# Patient Record
Sex: FEMALE | Race: ASIAN | NOT HISPANIC OR LATINO | URBAN - METROPOLITAN AREA
[De-identification: names, ages, dates, MRNs, and addresses within clinical notes are randomized per-mention and may not be internally consistent; named-entity substitution may affect disease eponyms.]

---

## 2021-02-12 ENCOUNTER — TELEPHONE (OUTPATIENT)
Dept: SCHEDULING | Facility: IMAGING CENTER | Age: 41
End: 2021-02-12

## 2021-02-25 ASSESSMENT — ENCOUNTER SYMPTOMS
CHILLS: 0
DEPRESSION: 0
VOMITING: 0
DIARRHEA: 0
BLURRED VISION: 0
PALPITATIONS: 0
WEAKNESS: 0
SHORTNESS OF BREATH: 0
CONSTIPATION: 0
NAUSEA: 0
FEVER: 0

## 2021-02-26 NOTE — PROGRESS NOTES
This evaluation was conducted via Zoom using secure and encrypted videoconferencing technology. The patient was in a private location in the [facility].    The patient's identity was confirmed and verbal consent was obtained for this virtual visit.      History of Present Illness  40 year old female presents via video visit to establish care.  She does not take any prescription medication, and is feeling generally well.    She does have a mole on the right side of her nose which is being followed by dermatology in [place], she was told to keep an eye on it for any changes.  She states it has been growing slowly over the last year.  She is wondering if she can have a referral to dermatology here in [place].    She denies any other questions or concerns at this time.    ROS  Review of Systems   Constitutional: Negative for chills and fever.   HENT: Negative for hearing loss.    Eyes: Negative for blurred vision.   Respiratory: Negative for shortness of breath.    Cardiovascular: Negative for chest pain and palpitations.   Gastrointestinal: Negative for constipation, diarrhea, nausea and vomiting.   Genitourinary: Negative for dysuria and hematuria.   Skin: Negative for rash.   Neurological: Negative for weakness.   Psychiatric/Behavioral: Negative for depression.     Medications  No current outpatient medications on file.     No current facility-administered medications for this visit.     Allergies  No Known Allergies    Problem List  There is no problem list on file for this patient.    Past Medical History  History reviewed. No pertinent past medical history.     Past Surgical History  Past Surgical History:   Procedure Laterality Date   • APPENDECTOMY  2006     Past Family History  Family History   Problem Relation Age of Onset   • No Known Problems Mother    • Heart Disease Father    • No Known Problems Sister    • Asthma Brother    • Cancer Maternal Grandfather         Stomach   • No Known Problems  "Son    • No Known Problems Daughter      Social History  She reports eating a healthy and balanced diet, but does not get regular exercise due to gym closure from COVID. She does not work outside of the home.  She drinks an average of 1-2 alcoholic beverages per month. She denies any tobacco product or illicit drug use. She is sexually active with one, male partner and uses condoms as contraception.     Physical Exam  Ht 1.549 m (5' 1\")   Wt 56.2 kg (124 lb)   BMI 23.43 kg/m²   Physical Exam   Constitutional: She is oriented to person, place, and time and well-developed, well-nourished, and in no distress. No distress.   Eyes: Right eye exhibits no discharge. Left eye exhibits no discharge. No scleral icterus.   Pulmonary/Chest: Effort normal. No respiratory distress.   Neurological: She is alert and oriented to person, place, and time.   Skin: She is not diaphoretic.   Psychiatric: Affect and judgment normal.     Assessment & Plan  1. Visit for preventive health examination  2. Encounter for screening mammogram for breast cancer  Health maintenance status reviewed and updated. We discussed diet, exercise, vaccinations, skin cancer prevention and detection, seat belt use, and regular eye and dental exams.  - Comp Metabolic Panel; Future  - Lipid Profile; Future  - HEMOGLOBIN A1C; Future  - MA-SCREENING MAMMO BILAT W/CAD; Future    3. Change in skin mole  Hard to assess via video visit, but she does report her mole has been changing.  I have placed a referral to dermatology.  - REFERRAL TO DERMATOLOGY    Return in about 1 year (around 3/3/2022) for Annual physical.    Cecilia Mckeon M.D.   "

## 2021-03-03 ENCOUNTER — TELEMEDICINE (OUTPATIENT)
Dept: MEDICAL GROUP | Facility: MEDICAL CENTER | Age: 41
End: 2021-03-03
Payer: COMMERCIAL

## 2021-03-03 VITALS — HEIGHT: 61 IN | BODY MASS INDEX: 23.41 KG/M2 | WEIGHT: 124 LBS

## 2021-03-03 DIAGNOSIS — Z00.00 VISIT FOR PREVENTIVE HEALTH EXAMINATION: ICD-10-CM

## 2021-03-03 DIAGNOSIS — D22.9 CHANGE IN SKIN MOLE: ICD-10-CM

## 2021-03-03 DIAGNOSIS — Z12.31 ENCOUNTER FOR SCREENING MAMMOGRAM FOR BREAST CANCER: ICD-10-CM

## 2021-03-03 PROCEDURE — 99386 PREV VISIT NEW AGE 40-64: CPT | Performed by: FAMILY MEDICINE

## 2021-03-03 ASSESSMENT — PATIENT HEALTH QUESTIONNAIRE - PHQ9: CLINICAL INTERPRETATION OF PHQ2 SCORE: 0

## 2021-07-29 ENCOUNTER — HOSPITAL ENCOUNTER (OUTPATIENT)
Dept: RADIOLOGY | Facility: MEDICAL CENTER | Age: 41
End: 2021-07-29
Payer: COMMERCIAL

## 2021-08-16 ENCOUNTER — HOSPITAL ENCOUNTER (OUTPATIENT)
Dept: RADIOLOGY | Facility: MEDICAL CENTER | Age: 41
End: 2021-08-16
Attending: FAMILY MEDICINE
Payer: COMMERCIAL

## 2021-08-16 DIAGNOSIS — Z12.31 ENCOUNTER FOR SCREENING MAMMOGRAM FOR BREAST CANCER: ICD-10-CM

## 2021-08-16 PROCEDURE — 77063 BREAST TOMOSYNTHESIS BI: CPT

## 2021-08-16 SDOH — ECONOMIC STABILITY: HOUSING INSECURITY: IN THE LAST 12 MONTHS, HOW MANY PLACES HAVE YOU LIVED?: 2

## 2021-08-16 SDOH — ECONOMIC STABILITY: FOOD INSECURITY: WITHIN THE PAST 12 MONTHS, YOU WORRIED THAT YOUR FOOD WOULD RUN OUT BEFORE YOU GOT MONEY TO BUY MORE.: NEVER TRUE

## 2021-08-16 SDOH — HEALTH STABILITY: PHYSICAL HEALTH: ON AVERAGE, HOW MANY MINUTES DO YOU ENGAGE IN EXERCISE AT THIS LEVEL?: 20 MIN

## 2021-08-16 SDOH — ECONOMIC STABILITY: FOOD INSECURITY: WITHIN THE PAST 12 MONTHS, THE FOOD YOU BOUGHT JUST DIDN'T LAST AND YOU DIDN'T HAVE MONEY TO GET MORE.: NEVER TRUE

## 2021-08-16 SDOH — ECONOMIC STABILITY: TRANSPORTATION INSECURITY
IN THE PAST 12 MONTHS, HAS LACK OF RELIABLE TRANSPORTATION KEPT YOU FROM MEDICAL APPOINTMENTS, MEETINGS, WORK OR FROM GETTING THINGS NEEDED FOR DAILY LIVING?: NO

## 2021-08-16 SDOH — ECONOMIC STABILITY: TRANSPORTATION INSECURITY
IN THE PAST 12 MONTHS, HAS THE LACK OF TRANSPORTATION KEPT YOU FROM MEDICAL APPOINTMENTS OR FROM GETTING MEDICATIONS?: NO

## 2021-08-16 SDOH — ECONOMIC STABILITY: HOUSING INSECURITY
IN THE LAST 12 MONTHS, WAS THERE A TIME WHEN YOU DID NOT HAVE A STEADY PLACE TO SLEEP OR SLEPT IN A SHELTER (INCLUDING NOW)?: NO

## 2021-08-16 SDOH — HEALTH STABILITY: PHYSICAL HEALTH: ON AVERAGE, HOW MANY DAYS PER WEEK DO YOU ENGAGE IN MODERATE TO STRENUOUS EXERCISE (LIKE A BRISK WALK)?: 4 DAYS

## 2021-08-16 SDOH — ECONOMIC STABILITY: INCOME INSECURITY: IN THE LAST 12 MONTHS, WAS THERE A TIME WHEN YOU WERE NOT ABLE TO PAY THE MORTGAGE OR RENT ON TIME?: NO

## 2021-08-16 SDOH — HEALTH STABILITY: MENTAL HEALTH
STRESS IS WHEN SOMEONE FEELS TENSE, NERVOUS, ANXIOUS, OR CAN'T SLEEP AT NIGHT BECAUSE THEIR MIND IS TROUBLED. HOW STRESSED ARE YOU?: ONLY A LITTLE

## 2021-08-16 SDOH — ECONOMIC STABILITY: INCOME INSECURITY: HOW HARD IS IT FOR YOU TO PAY FOR THE VERY BASICS LIKE FOOD, HOUSING, MEDICAL CARE, AND HEATING?: NOT HARD AT ALL

## 2021-08-16 SDOH — ECONOMIC STABILITY: TRANSPORTATION INSECURITY
IN THE PAST 12 MONTHS, HAS LACK OF TRANSPORTATION KEPT YOU FROM MEETINGS, WORK, OR FROM GETTING THINGS NEEDED FOR DAILY LIVING?: NO

## 2021-08-16 ASSESSMENT — SOCIAL DETERMINANTS OF HEALTH (SDOH)
HOW HARD IS IT FOR YOU TO PAY FOR THE VERY BASICS LIKE FOOD, HOUSING, MEDICAL CARE, AND HEATING?: NOT HARD AT ALL
HOW OFTEN DO YOU ATTENT MEETINGS OF THE CLUB OR ORGANIZATION YOU BELONG TO?: NEVER
IN A TYPICAL WEEK, HOW MANY TIMES DO YOU TALK ON THE PHONE WITH FAMILY, FRIENDS, OR NEIGHBORS?: MORE THAN THREE TIMES A WEEK
HOW OFTEN DO YOU HAVE A DRINK CONTAINING ALCOHOL: 2-4 TIMES A MONTH
DO YOU BELONG TO ANY CLUBS OR ORGANIZATIONS SUCH AS CHURCH GROUPS UNIONS, FRATERNAL OR ATHLETIC GROUPS, OR SCHOOL GROUPS?: NO
HOW MANY DRINKS CONTAINING ALCOHOL DO YOU HAVE ON A TYPICAL DAY WHEN YOU ARE DRINKING: 3 OR 4
DO YOU BELONG TO ANY CLUBS OR ORGANIZATIONS SUCH AS CHURCH GROUPS UNIONS, FRATERNAL OR ATHLETIC GROUPS, OR SCHOOL GROUPS?: NO
IN A TYPICAL WEEK, HOW MANY TIMES DO YOU TALK ON THE PHONE WITH FAMILY, FRIENDS, OR NEIGHBORS?: MORE THAN THREE TIMES A WEEK
HOW OFTEN DO YOU GET TOGETHER WITH FRIENDS OR RELATIVES?: ONCE A WEEK
WITHIN THE PAST 12 MONTHS, YOU WORRIED THAT YOUR FOOD WOULD RUN OUT BEFORE YOU GOT THE MONEY TO BUY MORE: NEVER TRUE
HOW OFTEN DO YOU HAVE SIX OR MORE DRINKS ON ONE OCCASION: LESS THAN MONTHLY
HOW OFTEN DO YOU ATTENT MEETINGS OF THE CLUB OR ORGANIZATION YOU BELONG TO?: NEVER
HOW OFTEN DO YOU ATTEND CHURCH OR RELIGIOUS SERVICES?: PATIENT DECLINED
HOW OFTEN DO YOU GET TOGETHER WITH FRIENDS OR RELATIVES?: ONCE A WEEK
HOW OFTEN DO YOU ATTEND CHURCH OR RELIGIOUS SERVICES?: PATIENT DECLINED

## 2021-08-16 ASSESSMENT — LIFESTYLE VARIABLES
HOW MANY STANDARD DRINKS CONTAINING ALCOHOL DO YOU HAVE ON A TYPICAL DAY: 3 OR 4
HOW OFTEN DO YOU HAVE A DRINK CONTAINING ALCOHOL: 2-4 TIMES A MONTH
HOW OFTEN DO YOU HAVE SIX OR MORE DRINKS ON ONE OCCASION: LESS THAN MONTHLY

## 2021-08-19 ENCOUNTER — OFFICE VISIT (OUTPATIENT)
Dept: MEDICAL GROUP | Facility: LAB | Age: 41
End: 2021-08-19
Payer: COMMERCIAL

## 2021-08-19 VITALS
TEMPERATURE: 98.4 F | SYSTOLIC BLOOD PRESSURE: 102 MMHG | RESPIRATION RATE: 14 BRPM | WEIGHT: 118.8 LBS | HEIGHT: 63 IN | HEART RATE: 62 BPM | BODY MASS INDEX: 21.05 KG/M2 | OXYGEN SATURATION: 99 % | DIASTOLIC BLOOD PRESSURE: 74 MMHG

## 2021-08-19 DIAGNOSIS — Z13.6 ENCOUNTER FOR SCREENING FOR CARDIOVASCULAR DISORDERS: ICD-10-CM

## 2021-08-19 PROCEDURE — 99999 PR NO CHARGE: CPT | Performed by: FAMILY MEDICINE

## 2021-08-19 NOTE — PROGRESS NOTES
"Subjective:     Chief Complaint   Patient presents with   • Establish Care         HPI:   Tanvi presents today to establish care. Last visit was a telemedicine physical on March 2021. She does have some lab work to do still from that visit.   Last pap: Jan 2020, negative cotesting.   Mammo: Normal August 2021.   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     Diet: Lots of veggies good protein, tofu, soy based, rice. Some dairy, yogurt, milk, cheese. No OTC medications or vitamins.   Exercise: some stretching and such, inside because of smoke of late. Some walking  Sleep: Good.   Sometimes some seasonal allergies in the spring, but nothing much.     No current [facility]-ordered outpatient medications on file.     No current [facility]-ordered facility-administered medications on file.         ROS:  Gen: no fevers/chills, no changes in weight  Eyes: no changes in vision  ENT: no sore throat, no hearing loss, no bloody nose  Pulm: no sob, no cough  CV: no chest pain, no palpitations  GI: no nausea/vomiting, no diarrhea  : no dysuria  MSk: no myalgias  Skin: no rash      Objective:     Exam:  /74 (BP Location: Right arm, Patient Position: Sitting, BP Cuff Size: Adult)   Pulse 62   Temp 36.9 °C (98.4 °F)   Resp 14   Ht 1.6 m (5' 3\")   Wt 53.9 kg (118 lb 12.8 oz)   SpO2 99%   BMI 21.04 kg/m²  Body mass index is 21.04 kg/m².    Gen: Alert and oriented, No apparent distress.  Neck: Neck is supple without lymphadenopathy.  Lungs: Normal effort, CTA bilaterally, no wheezes, rhonchi, or rales  CV: Regular rate and rhythm. No murmurs, rubs, or gallops.  Ext: No clubbing, cyanosis, edema.        Labs:due    Assessment & Plan:     41 y.o. female with the following -     1. Encounter for screening for cardiovascular disorders  Reviewed past history, and family history.  She is doing quite well and is very healthy in general.  Not taking any over-the-counter or prescribed medications.  Laboratories were put in today for her to do fasting at " her convenience.  Other health maintenance things are caught up today.  - Comp Metabolic Panel; Future  - Lipid Profile; Future        No follow-ups on file.    Please note that this dictation was created using voice recognition software. I have made every reasonable attempt to correct obvious errors, but I expect that there are errors of grammar and possibly content that I did not discover before finalizing the note.

## 2021-09-15 ENCOUNTER — OFFICE VISIT (OUTPATIENT)
Dept: DERMATOLOGY | Facility: IMAGING CENTER | Age: 41
End: 2021-09-15
Payer: COMMERCIAL

## 2021-09-15 DIAGNOSIS — D48.5 NEOPLASM OF UNCERTAIN BEHAVIOR OF SKIN: ICD-10-CM

## 2021-09-15 PROCEDURE — 99203 OFFICE O/P NEW LOW 30 MIN: CPT | Performed by: NURSE PRACTITIONER

## 2021-09-15 NOTE — PROGRESS NOTES
DERMATOLOGY NOTE  NEW VISIT       Chief complaint: Establish Care and Skin Lesion         HPI/location: nose mole-lower nose on right    Time present: since 16 years old last   Painful lesion: No  Itching lesion: No   Enlarging lesion: Yes 3-4 years changing  Anything make it better or worse?    History of skin cancer: No  History of precancers/actinic keratoses: No  History of biopsies:No  History of blistering/severe sunburns:No  Family history of skin cancer:No  Family history of atypical moles:No      No Known Allergies     MEDICATIONS:  Medications relevant to specialty reviewed.     REVIEW OF SYSTEMS:   Positive for skin (see HPI)  Negative for fevers and chills       EXAM:  There were no vitals taken for this visit.  Constitutional: Well-developed, well-nourished, and in no distress.     A focused skin exam was performed including the affected areas of the head (including face). Notable findings on exam today listed below and/or in assessment/plan.     3mm dark brown papule to right ala-largely symmetrical     IMPRESSION / PLAN:    1. Neoplasm of uncertain behavior of skin  Given growth and dark color, biopsy is warranted  Given cosmetically sensitive area, referral to plastics done.     - REFERRAL TO PLASTIC SURGERY       Please note that this dictation was created using voice recognition software. I have made every reasonable attempt to correct obvious errors, but I expect that there are errors of grammar and possibly content that I did not discover before finalizing the note.      Return to clinic in: No follow-ups on file. and as needed for any new or changing skin lesions.

## 2021-10-07 ENCOUNTER — NON-PROVIDER VISIT (OUTPATIENT)
Dept: MEDICAL GROUP | Facility: LAB | Age: 41
End: 2021-10-07
Payer: COMMERCIAL

## 2021-10-07 DIAGNOSIS — Z23 NEED FOR VACCINATION: ICD-10-CM

## 2021-10-07 PROCEDURE — 90686 IIV4 VACC NO PRSV 0.5 ML IM: CPT | Performed by: FAMILY MEDICINE

## 2021-10-07 PROCEDURE — 90471 IMMUNIZATION ADMIN: CPT | Performed by: FAMILY MEDICINE

## 2021-10-07 NOTE — PROGRESS NOTES
"Tanvi Fontanez is a 41 y.o. female here for a non-provider visit for:   FLU    Reason for immunization: Annual Flu Vaccine  Immunization records indicate need for vaccine: Yes, confirmed with Epic  Minimum interval has been met for this vaccine: Yes  ABN completed: Not Indicated    VIS Dated  08/06/2021 was given to patient: Yes  All IAC Questionnaire questions were answered \"No.\"    Patient tolerated injection and no adverse effects were observed or reported: Yes    Pt scheduled for next dose in series: Not Indicated  "

## 2023-09-07 ENCOUNTER — APPOINTMENT (OUTPATIENT)
Dept: RADIOLOGY | Facility: MEDICAL CENTER | Age: 43
End: 2023-09-07
Attending: FAMILY MEDICINE
Payer: COMMERCIAL

## 2023-09-07 DIAGNOSIS — Z12.31 VISIT FOR SCREENING MAMMOGRAM: ICD-10-CM

## 2023-09-07 PROCEDURE — 77063 BREAST TOMOSYNTHESIS BI: CPT

## 2023-09-18 SDOH — HEALTH STABILITY: PHYSICAL HEALTH: ON AVERAGE, HOW MANY MINUTES DO YOU ENGAGE IN EXERCISE AT THIS LEVEL?: 90 MIN

## 2023-09-18 SDOH — HEALTH STABILITY: MENTAL HEALTH
STRESS IS WHEN SOMEONE FEELS TENSE, NERVOUS, ANXIOUS, OR CAN'T SLEEP AT NIGHT BECAUSE THEIR MIND IS TROUBLED. HOW STRESSED ARE YOU?: NOT AT ALL

## 2023-09-18 SDOH — HEALTH STABILITY: PHYSICAL HEALTH: ON AVERAGE, HOW MANY DAYS PER WEEK DO YOU ENGAGE IN MODERATE TO STRENUOUS EXERCISE (LIKE A BRISK WALK)?: 1 DAY

## 2023-09-18 SDOH — ECONOMIC STABILITY: INCOME INSECURITY: IN THE LAST 12 MONTHS, WAS THERE A TIME WHEN YOU WERE NOT ABLE TO PAY THE MORTGAGE OR RENT ON TIME?: NO

## 2023-09-18 SDOH — ECONOMIC STABILITY: FOOD INSECURITY: WITHIN THE PAST 12 MONTHS, YOU WORRIED THAT YOUR FOOD WOULD RUN OUT BEFORE YOU GOT MONEY TO BUY MORE.: NEVER TRUE

## 2023-09-18 SDOH — ECONOMIC STABILITY: HOUSING INSECURITY: IN THE LAST 12 MONTHS, HOW MANY PLACES HAVE YOU LIVED?: 1

## 2023-09-18 SDOH — ECONOMIC STABILITY: FOOD INSECURITY: WITHIN THE PAST 12 MONTHS, THE FOOD YOU BOUGHT JUST DIDN'T LAST AND YOU DIDN'T HAVE MONEY TO GET MORE.: NEVER TRUE

## 2023-09-18 SDOH — ECONOMIC STABILITY: INCOME INSECURITY: HOW HARD IS IT FOR YOU TO PAY FOR THE VERY BASICS LIKE FOOD, HOUSING, MEDICAL CARE, AND HEATING?: NOT HARD AT ALL

## 2023-09-18 ASSESSMENT — LIFESTYLE VARIABLES
HOW OFTEN DO YOU HAVE A DRINK CONTAINING ALCOHOL: 2-4 TIMES A MONTH
SKIP TO QUESTIONS 9-10: 0
AUDIT-C TOTAL SCORE: -1
HOW MANY STANDARD DRINKS CONTAINING ALCOHOL DO YOU HAVE ON A TYPICAL DAY: 1 OR 2
HOW OFTEN DO YOU HAVE SIX OR MORE DRINKS ON ONE OCCASION: PATIENT DECLINED

## 2023-09-18 ASSESSMENT — SOCIAL DETERMINANTS OF HEALTH (SDOH)
HOW MANY DRINKS CONTAINING ALCOHOL DO YOU HAVE ON A TYPICAL DAY WHEN YOU ARE DRINKING: 1 OR 2
HOW HARD IS IT FOR YOU TO PAY FOR THE VERY BASICS LIKE FOOD, HOUSING, MEDICAL CARE, AND HEATING?: NOT HARD AT ALL
HOW OFTEN DO YOU ATTENT MEETINGS OF THE CLUB OR ORGANIZATION YOU BELONG TO?: PATIENT DECLINED
HOW OFTEN DO YOU GET TOGETHER WITH FRIENDS OR RELATIVES?: ONCE A WEEK
HOW OFTEN DO YOU ATTENT MEETINGS OF THE CLUB OR ORGANIZATION YOU BELONG TO?: PATIENT DECLINED
WITHIN THE PAST 12 MONTHS, YOU WORRIED THAT YOUR FOOD WOULD RUN OUT BEFORE YOU GOT THE MONEY TO BUY MORE: NEVER TRUE
HOW OFTEN DO YOU HAVE A DRINK CONTAINING ALCOHOL: 2-4 TIMES A MONTH
IN A TYPICAL WEEK, HOW MANY TIMES DO YOU TALK ON THE PHONE WITH FAMILY, FRIENDS, OR NEIGHBORS?: MORE THAN THREE TIMES A WEEK
HOW OFTEN DO YOU ATTEND CHURCH OR RELIGIOUS SERVICES?: PATIENT DECLINED
HOW OFTEN DO YOU ATTEND CHURCH OR RELIGIOUS SERVICES?: PATIENT DECLINED
IN A TYPICAL WEEK, HOW MANY TIMES DO YOU TALK ON THE PHONE WITH FAMILY, FRIENDS, OR NEIGHBORS?: MORE THAN THREE TIMES A WEEK
HOW OFTEN DO YOU GET TOGETHER WITH FRIENDS OR RELATIVES?: ONCE A WEEK
HOW OFTEN DO YOU HAVE SIX OR MORE DRINKS ON ONE OCCASION: PATIENT DECLINED
DO YOU BELONG TO ANY CLUBS OR ORGANIZATIONS SUCH AS CHURCH GROUPS UNIONS, FRATERNAL OR ATHLETIC GROUPS, OR SCHOOL GROUPS?: PATIENT DECLINED
DO YOU BELONG TO ANY CLUBS OR ORGANIZATIONS SUCH AS CHURCH GROUPS UNIONS, FRATERNAL OR ATHLETIC GROUPS, OR SCHOOL GROUPS?: PATIENT DECLINED

## 2023-09-21 ENCOUNTER — OFFICE VISIT (OUTPATIENT)
Dept: MEDICAL GROUP | Facility: LAB | Age: 43
End: 2023-09-21
Payer: COMMERCIAL

## 2023-09-21 ENCOUNTER — HOSPITAL ENCOUNTER (OUTPATIENT)
Facility: MEDICAL CENTER | Age: 43
End: 2023-09-21
Attending: FAMILY MEDICINE
Payer: COMMERCIAL

## 2023-09-21 VITALS
DIASTOLIC BLOOD PRESSURE: 72 MMHG | SYSTOLIC BLOOD PRESSURE: 106 MMHG | TEMPERATURE: 97.4 F | HEIGHT: 62 IN | BODY MASS INDEX: 21.57 KG/M2 | RESPIRATION RATE: 14 BRPM | WEIGHT: 117.2 LBS | HEART RATE: 64 BPM | OXYGEN SATURATION: 96 %

## 2023-09-21 DIAGNOSIS — Z12.39 ENCOUNTER FOR BREAST CANCER SCREENING USING NON-MAMMOGRAM MODALITY: ICD-10-CM

## 2023-09-21 DIAGNOSIS — Z11.59 NEED FOR HEPATITIS C SCREENING TEST: ICD-10-CM

## 2023-09-21 DIAGNOSIS — Z01.419 WELL WOMAN EXAM WITH ROUTINE GYNECOLOGICAL EXAM: ICD-10-CM

## 2023-09-21 DIAGNOSIS — Z12.11 COLON CANCER SCREENING: ICD-10-CM

## 2023-09-21 DIAGNOSIS — Z12.4 SCREENING FOR CERVICAL CANCER: ICD-10-CM

## 2023-09-21 DIAGNOSIS — E55.9 VITAMIN D DEFICIENCY: ICD-10-CM

## 2023-09-21 DIAGNOSIS — R92.30 DENSE BREAST TISSUE: ICD-10-CM

## 2023-09-21 PROCEDURE — 88175 CYTOPATH C/V AUTO FLUID REDO: CPT

## 2023-09-21 PROCEDURE — 3074F SYST BP LT 130 MM HG: CPT | Performed by: FAMILY MEDICINE

## 2023-09-21 PROCEDURE — 87624 HPV HI-RISK TYP POOLED RSLT: CPT

## 2023-09-21 PROCEDURE — 3078F DIAST BP <80 MM HG: CPT | Performed by: FAMILY MEDICINE

## 2023-09-21 PROCEDURE — 99396 PREV VISIT EST AGE 40-64: CPT | Performed by: FAMILY MEDICINE

## 2023-09-21 NOTE — PROGRESS NOTES
"Subjective:     Chief Complaint   Patient presents with    Annual Exam    Gynecologic Exam         HPI:   Tanvi presents today with annual and gyne exam.     Diet: Japanese, rice, vegetables, meat, fish.   Exercise: once weekly running 90 minutes.   Sleep: good, no concerns. Sometimes waking for bathroom.    Last pap 2020.   LMP: short, regular, 9/12/23, 4-5 days.   Colonoscopy: due  Mammo sept 2023.   Maternal grandfather with stomach cancer.   Father with heart attack. Age 65.         No current [facility]-ordered outpatient medications on file.     No current [facility]-ordered facility-administered medications on file.         ROS:  Gen: no fevers/chills, no changes in weight  Eyes: no changes in vision  ENT: no sore throat, no hearing loss, no bloody nose  Pulm: no sob, no cough  CV: no chest pain, no palpitations  GI: no nausea/vomiting, no diarrhea  : no dysuria  MSk: no myalgias  Skin: no rash  Neuro: no headaches, no numbness/tingling  Heme/Lymph: no easy bruising      Objective:     Exam:  /72 (BP Location: Left arm, Patient Position: Sitting, BP Cuff Size: Adult)   Pulse 64   Temp 36.3 °C (97.4 °F)   Resp 14   Ht 1.575 m (5' 2\")   Wt 53.2 kg (117 lb 3.2 oz)   SpO2 96%   BMI 21.44 kg/m²  Body mass index is 21.44 kg/m².    Gen: Alert and oriented, No apparent distress.  Neck: Neck is supple without lymphadenopathy.  Lungs: Normal effort, CTA bilaterally, no wheezes, rhonchi, or rales  CV: Regular rate and rhythm. No murmurs, rubs, or gallops.  Ext: No clubbing, cyanosis, edema.  : Normal externally.  Physiologic discharge present.  Cervix appears pink and healthy.  Mild friability on Pap collection.  Pap collected today.  Bimanual without masses.      Assessment & Plan:     43 y.o. female with the following -     1. Well woman exam with routine gynecological exam  Discussed diet and exercise recommendations.  Really she is doing fairly well.  Vitals and weight look great today.  We will get some " routine screening labs and she has not had any in some time.  - CBC WITH DIFFERENTIAL; Future  - Comp Metabolic Panel; Future  - HEMOGLOBIN A1C; Future  - Lipid Profile; Future  - TSH WITH REFLEX TO FT4; Future    2. Colon cancer screening  We did discuss age-related anticipatory guidance.  She is due for colonoscopy.  Referral placed.  - Referral to Gastroenterology    3. Screening for cervical cancer  Pap collected today.  - THINPREP PAP WITH HPV; Future    4. Encounter for breast cancer screening using non-mammogram modality  Very dense breast tissue present.  We did discuss the option of pursuing an ultrasound as well.  She will get this done.  - US-BREAST BILAT-COMPLETE; Future    5. Vitamin D deficiency    - VITAMIN D,25 HYDROXY (DEFICIENCY); Future    6. Need for hepatitis C screening test    - HEP C VIRUS ANTIBODY; Future            No follow-ups on file.    Please note that this dictation was created using voice recognition software. I have made every reasonable attempt to correct obvious errors, but I expect that there are errors of grammar and possibly content that I did not discover before finalizing the note.

## 2023-09-23 LAB
CYTOLOGIST CVX/VAG CYTO: NORMAL
CYTOLOGY CVX/VAG DOC CYTO: NORMAL
CYTOLOGY CVX/VAG DOC THIN PREP: NORMAL
HPV I/H RISK 4 DNA CVX QL PROBE+SIG AMP: NEGATIVE
NOTE NL11727A: NORMAL
OTHER STN SPEC: NORMAL
STAT OF ADQ CVX/VAG CYTO-IMP: NORMAL

## 2023-10-06 ENCOUNTER — HOSPITAL ENCOUNTER (OUTPATIENT)
Dept: LAB | Facility: MEDICAL CENTER | Age: 43
End: 2023-10-06
Attending: FAMILY MEDICINE
Payer: COMMERCIAL

## 2023-10-06 DIAGNOSIS — Z11.59 NEED FOR HEPATITIS C SCREENING TEST: ICD-10-CM

## 2023-10-06 DIAGNOSIS — Z01.419 WELL WOMAN EXAM WITH ROUTINE GYNECOLOGICAL EXAM: ICD-10-CM

## 2023-10-06 DIAGNOSIS — E55.9 VITAMIN D DEFICIENCY: ICD-10-CM

## 2023-10-06 LAB
25(OH)D3 SERPL-MCNC: 20 NG/ML (ref 30–100)
ALBUMIN SERPL BCP-MCNC: 4.5 G/DL (ref 3.2–4.9)
ALBUMIN/GLOB SERPL: 1.6 G/DL
ALP SERPL-CCNC: 50 U/L (ref 30–99)
ALT SERPL-CCNC: 21 U/L (ref 2–50)
ANION GAP SERPL CALC-SCNC: 10 MMOL/L (ref 7–16)
AST SERPL-CCNC: 22 U/L (ref 12–45)
BASOPHILS # BLD AUTO: 0.8 % (ref 0–1.8)
BASOPHILS # BLD: 0.04 K/UL (ref 0–0.12)
BILIRUB SERPL-MCNC: 0.6 MG/DL (ref 0.1–1.5)
BUN SERPL-MCNC: 12 MG/DL (ref 8–22)
CALCIUM ALBUM COR SERPL-MCNC: 9.1 MG/DL (ref 8.5–10.5)
CALCIUM SERPL-MCNC: 9.5 MG/DL (ref 8.5–10.5)
CHLORIDE SERPL-SCNC: 107 MMOL/L (ref 96–112)
CHOLEST SERPL-MCNC: 184 MG/DL (ref 100–199)
CO2 SERPL-SCNC: 25 MMOL/L (ref 20–33)
CREAT SERPL-MCNC: 0.64 MG/DL (ref 0.5–1.4)
EOSINOPHIL # BLD AUTO: 0.1 K/UL (ref 0–0.51)
EOSINOPHIL NFR BLD: 2 % (ref 0–6.9)
ERYTHROCYTE [DISTWIDTH] IN BLOOD BY AUTOMATED COUNT: 44.9 FL (ref 35.9–50)
EST. AVERAGE GLUCOSE BLD GHB EST-MCNC: 103 MG/DL
FASTING STATUS PATIENT QL REPORTED: NORMAL
GFR SERPLBLD CREATININE-BSD FMLA CKD-EPI: 111 ML/MIN/1.73 M 2
GLOBULIN SER CALC-MCNC: 2.9 G/DL (ref 1.9–3.5)
GLUCOSE SERPL-MCNC: 95 MG/DL (ref 65–99)
HBA1C MFR BLD: 5.2 % (ref 4–5.6)
HCT VFR BLD AUTO: 42.2 % (ref 37–47)
HCV AB SER QL: NORMAL
HDLC SERPL-MCNC: 94 MG/DL
HGB BLD-MCNC: 13.5 G/DL (ref 12–16)
IMM GRANULOCYTES # BLD AUTO: 0.01 K/UL (ref 0–0.11)
IMM GRANULOCYTES NFR BLD AUTO: 0.2 % (ref 0–0.9)
LDLC SERPL CALC-MCNC: 81 MG/DL
LYMPHOCYTES # BLD AUTO: 1.26 K/UL (ref 1–4.8)
LYMPHOCYTES NFR BLD: 24.7 % (ref 22–41)
MCH RBC QN AUTO: 31.5 PG (ref 27–33)
MCHC RBC AUTO-ENTMCNC: 32 G/DL (ref 32.2–35.5)
MCV RBC AUTO: 98.4 FL (ref 81.4–97.8)
MONOCYTES # BLD AUTO: 0.44 K/UL (ref 0–0.85)
MONOCYTES NFR BLD AUTO: 8.6 % (ref 0–13.4)
NEUTROPHILS # BLD AUTO: 3.26 K/UL (ref 1.82–7.42)
NEUTROPHILS NFR BLD: 63.7 % (ref 44–72)
NRBC # BLD AUTO: 0 K/UL
NRBC BLD-RTO: 0 /100 WBC (ref 0–0.2)
PLATELET # BLD AUTO: 258 K/UL (ref 164–446)
PMV BLD AUTO: 9.9 FL (ref 9–12.9)
POTASSIUM SERPL-SCNC: 4.5 MMOL/L (ref 3.6–5.5)
PROT SERPL-MCNC: 7.4 G/DL (ref 6–8.2)
RBC # BLD AUTO: 4.29 M/UL (ref 4.2–5.4)
SODIUM SERPL-SCNC: 142 MMOL/L (ref 135–145)
TRIGL SERPL-MCNC: 45 MG/DL (ref 0–149)
TSH SERPL DL<=0.005 MIU/L-ACNC: 2.62 UIU/ML (ref 0.38–5.33)
WBC # BLD AUTO: 5.1 K/UL (ref 4.8–10.8)

## 2023-10-06 PROCEDURE — 80053 COMPREHEN METABOLIC PANEL: CPT

## 2023-10-06 PROCEDURE — 36415 COLL VENOUS BLD VENIPUNCTURE: CPT

## 2023-10-06 PROCEDURE — 84443 ASSAY THYROID STIM HORMONE: CPT

## 2023-10-06 PROCEDURE — 83036 HEMOGLOBIN GLYCOSYLATED A1C: CPT

## 2023-10-06 PROCEDURE — 80061 LIPID PANEL: CPT

## 2023-10-06 PROCEDURE — 85025 COMPLETE CBC W/AUTO DIFF WBC: CPT

## 2023-10-06 PROCEDURE — 86803 HEPATITIS C AB TEST: CPT

## 2023-10-06 PROCEDURE — 82306 VITAMIN D 25 HYDROXY: CPT

## 2024-02-01 ENCOUNTER — HOSPITAL ENCOUNTER (OUTPATIENT)
Dept: RADIOLOGY | Facility: MEDICAL CENTER | Age: 44
End: 2024-02-01
Attending: FAMILY MEDICINE
Payer: COMMERCIAL

## 2024-02-01 DIAGNOSIS — R92.30 DENSE BREAST TISSUE: ICD-10-CM

## 2024-02-01 PROCEDURE — 76641 ULTRASOUND BREAST COMPLETE: CPT

## 2025-03-15 ENCOUNTER — HOSPITAL ENCOUNTER (EMERGENCY)
Facility: MEDICAL CENTER | Age: 45
End: 2025-03-15
Attending: EMERGENCY MEDICINE
Payer: COMMERCIAL

## 2025-03-15 VITALS
TEMPERATURE: 96.9 F | HEART RATE: 61 BPM | OXYGEN SATURATION: 98 % | WEIGHT: 123.46 LBS | HEIGHT: 63 IN | BODY MASS INDEX: 21.88 KG/M2 | RESPIRATION RATE: 16 BRPM | SYSTOLIC BLOOD PRESSURE: 118 MMHG | DIASTOLIC BLOOD PRESSURE: 75 MMHG

## 2025-03-15 DIAGNOSIS — S01.81XA FACIAL LACERATION, INITIAL ENCOUNTER: ICD-10-CM

## 2025-03-15 PROCEDURE — 304217 HCHG IRRIGATION SYSTEM

## 2025-03-15 PROCEDURE — 99282 EMERGENCY DEPT VISIT SF MDM: CPT

## 2025-03-15 PROCEDURE — 304999 HCHG REPAIR-SIMPLE/INTERMED LEVEL 1

## 2025-03-15 PROCEDURE — 303747 HCHG EXTRA SUTURE

## 2025-03-15 PROCEDURE — 700101 HCHG RX REV CODE 250: Performed by: EMERGENCY MEDICINE

## 2025-03-15 RX ORDER — LIDOCAINE HYDROCHLORIDE AND EPINEPHRINE BITARTRATE 20; .01 MG/ML; MG/ML
10 INJECTION, SOLUTION SUBCUTANEOUS ONCE
Status: COMPLETED | OUTPATIENT
Start: 2025-03-15 | End: 2025-03-15

## 2025-03-15 RX ADMIN — LIDOCAINE HYDROCHLORIDE AND EPINEPHRINE 10 ML: 10; 20 INJECTION, SOLUTION INFILTRATION; PERINEURAL at 08:45

## 2025-03-15 ASSESSMENT — FIBROSIS 4 INDEX: FIB4 SCORE: 0.86

## 2025-03-15 NOTE — ED NOTES
Dc instructions reviewed with pt and spouse. Aware of need to clean wound daily, pat dry and apply antibx ointment, sutures out in 5 days, return for s/s of infection. Ok for ice and otc meds for pain

## 2025-03-15 NOTE — DISCHARGE INSTRUCTIONS
Above are usual directions for laceration care  My recommendation is antibiotic ointment at night.  Can keep it covered during the day or let it air dry.  Soap and water is fine.  Sutures need to come out in 5 days.    Infection is very very low but not 0% therefore if see redness or swelling please come back to the ER.    You may want to take some Tylenol and Motrin once the lidocaine wears off and most people do well.

## 2025-03-15 NOTE — ED TRIAGE NOTES
"BIB spouse for following complaints.     Chief Complaint   Patient presents with    Head Laceration     Pt had snowboard fall over and hit them in the head. Lac to middle forehead. Unsure last tetanus shot. Thinks it has been more than 5 years.      /81   Pulse 73   Temp 36.7 °C (98 °F) (Temporal)   Resp 14   Ht 1.6 m (5' 3\")   Wt 56 kg (123 lb 7.3 oz)   LMP 03/03/2025 (Exact Date)   SpO2 97%   Breastfeeding No   BMI 21.87 kg/m²     "

## 2025-03-15 NOTE — ED PROVIDER NOTES
CHIEF COMPLAINT  Chief Complaint   Patient presents with    Head Laceration     Pt had snowboard fall over and hit them in the head. Lac to middle forehead. Unsure last tetanus shot. Thinks it has been more than 5 years.        LIMITATION TO HISTORY   None    HPI    Tanvi Fontanez is a 44 y.o. female   Who, as per chart has tetanus up-to-date  Comes in with head laceration.  It is on the forehead.  Patient states that it occurred just prior to arrival.  There is no associated loss of consciousness or other associated pain.  She here with her  for requesting suture repair    They were skiing with her kids she went to grab the snowboard that was on a shelf in the garage then fell and hit her on her forehead.    OUTSIDE HISTORIAN(S):   at the bedside.  He states that the snowboards were kept on a shelf and therefore they fell down and slipped and hit her head    EXTERNAL RECORDS REVIEWED  Noted tetanus booster up-to-date    REVIEW OF SYSTEMS  No acute distress    PAST MEDICAL HISTORY  Past Medical History:   Diagnosis Date    Patient denies medical problems        FAMILY HISTORY  Family History   Problem Relation Age of Onset    No Known Problems Mother     Heart Disease Father 65        MI    No Known Problems Sister     Asthma Brother     Cancer Maternal Grandfather         Stomach    No Known Problems Son     No Known Problems Daughter        SOCIAL HISTORY  Social History     Tobacco Use    Smoking status: Never    Smokeless tobacco: Never   Vaping Use    Vaping status: Never Used   Substance Use Topics    Alcohol use: Yes     Alcohol/week: 1.2 oz     Types: 1 Glasses of wine, 1 Cans of beer per week     Comment: monthly    Drug use: Never     Social History     Substance and Sexual Activity   Drug Use Never       SURGICAL HISTORY  Past Surgical History:   Procedure Laterality Date    APPENDECTOMY  2006       CURRENT MEDICATIONS  No current facility-administered medications for this encounter.  No  "current outpatient medications on file.    ALLERGIES  No Known Allergies    PHYSICAL EXAM  VITAL SIGNS: /81   Pulse 73   Temp 36.7 °C (98 °F) (Temporal)   Resp 14   Ht 1.6 m (5' 3\")   Wt 56 kg (123 lb 7.3 oz)   LMP 03/03/2025 (Exact Date)   SpO2 97%   Breastfeeding No   BMI 21.87 kg/m²   Reviewed and noted.  Constitutional: Well developed, Well nourished, no acute distress.  HENT:   Has a 2-1/2 cm laceration on her forehead.  It is vertical.  Negative raccoon eyes, negative Buchanan sign    Eyes: PERRLA, conjunctiva pink, no scleral icterus.   Respiratory: No respiratory distress or stridor     Skin: Laceration on the forehead see above.  Musculoskeletal: She can range her neck without difficulty or pain  Neurologic: GCS 15  Psychiatric: Affect normal, Judgment normal, Mood normal.           PROBLEMS EVALUATED THIS VISIT:  Head injury.  Tetanus up-to-date no other associated lacerations or injury noted    MEDICAL DECISION MAKING:  Patient laceration notes concern for intracranial injury.       PLAN:  Laceration repair        RESULTS    LABS Ordered and Reviewed by Me:            ED COURSE:    ED Observation Status? No   No noted need for observation for developing issue    INTERVENTIONS BY ME:  Laceration Repair Procedure Note    Indication: Laceration    Procedure: The patient was placed in the appropriate position and anesthesia around the laceration was obtained by infiltration using 2% Lidocaine with epinephrine. The area was then irrigated with normal saline. The laceration was closed with 6-0 Ethilon using interrupted sutures. There were no additional lacerations requiring repair. The wound area was then dressed with bacitracin.      Total repaired wound length: 2.5 cm.     Other Items: Suture count: 11    The patient tolerated the procedure well.    Complications: None      FINAL DISPO PLAN     Home  Precautionary instructions were given signs of infection were discussed with family and to return " to ER if this occurs otherwise laceration repair and general wound care management discussion with family    Followup:  [facility], Emergency Dept  [address]  [place] [id number] [phone]  In 5 days  For suture removal      CONDITION: Improved    FINAL IMPRESSION  1. Facial laceration, initial encounter

## 2025-03-20 ENCOUNTER — OFFICE VISIT (OUTPATIENT)
Dept: URGENT CARE | Facility: CLINIC | Age: 45
End: 2025-03-20
Payer: COMMERCIAL

## 2025-03-20 VITALS
HEIGHT: 63 IN | WEIGHT: 125 LBS | TEMPERATURE: 97.7 F | OXYGEN SATURATION: 100 % | SYSTOLIC BLOOD PRESSURE: 110 MMHG | DIASTOLIC BLOOD PRESSURE: 76 MMHG | HEART RATE: 60 BPM | RESPIRATION RATE: 14 BRPM | BODY MASS INDEX: 22.15 KG/M2

## 2025-03-20 DIAGNOSIS — S01.81XD LACERATION OF FOREHEAD, SUBSEQUENT ENCOUNTER: ICD-10-CM

## 2025-03-20 DIAGNOSIS — Z48.02 VISIT FOR SUTURE REMOVAL: ICD-10-CM

## 2025-03-20 PROCEDURE — 99212 OFFICE O/P EST SF 10 MIN: CPT | Mod: 25 | Performed by: STUDENT IN AN ORGANIZED HEALTH CARE EDUCATION/TRAINING PROGRAM

## 2025-03-20 PROCEDURE — 15853 REMOVAL SUTR/STAPL XREQ ANES: CPT | Performed by: STUDENT IN AN ORGANIZED HEALTH CARE EDUCATION/TRAINING PROGRAM

## 2025-03-20 ASSESSMENT — FIBROSIS 4 INDEX: FIB4 SCORE: 0.86
